# Patient Record
Sex: FEMALE | Race: WHITE | URBAN - METROPOLITAN AREA
[De-identification: names, ages, dates, MRNs, and addresses within clinical notes are randomized per-mention and may not be internally consistent; named-entity substitution may affect disease eponyms.]

---

## 2019-03-19 ENCOUNTER — OFFICE VISIT (OUTPATIENT)
Dept: URBAN - METROPOLITAN AREA CLINIC 32 | Facility: CLINIC | Age: 79
End: 2019-03-19
Payer: COMMERCIAL

## 2019-03-19 PROCEDURE — 99204 OFFICE O/P NEW MOD 45 MIN: CPT | Performed by: OPHTHALMOLOGY

## 2019-03-19 RX ORDER — OFLOXACIN 3 MG/ML
0.3 % SOLUTION/ DROPS OPHTHALMIC
Qty: 5 | Refills: 1 | Status: INACTIVE
Start: 2019-03-19 | End: 2019-07-02

## 2019-03-19 RX ORDER — DUREZOL 0.5 MG/ML
0.05 % EMULSION OPHTHALMIC
Qty: 2.5 | Refills: 1 | Status: INACTIVE
Start: 2019-03-19 | End: 2019-07-02

## 2019-03-19 ASSESSMENT — VISUAL ACUITY
OS: 20/60
OD: 20/60

## 2019-03-19 ASSESSMENT — INTRAOCULAR PRESSURE
OS: 19
OD: 18

## 2019-03-19 ASSESSMENT — KERATOMETRY
OD: 45.38
OS: 42.50

## 2019-07-02 ENCOUNTER — TESTING ONLY (OUTPATIENT)
Dept: URBAN - METROPOLITAN AREA CLINIC 32 | Facility: CLINIC | Age: 79
End: 2019-07-02
Payer: MEDICARE

## 2019-07-02 DIAGNOSIS — H25.813 COMBINED FORMS OF AGE-RELATED CATARACT, BILATERAL: Primary | ICD-10-CM

## 2019-07-02 PROCEDURE — 92136 OPHTHALMIC BIOMETRY: CPT | Performed by: OPHTHALMOLOGY

## 2019-07-02 RX ORDER — DUREZOL 0.5 MG/ML
0.05 % EMULSION OPHTHALMIC
Qty: 2.5 | Refills: 1 | Status: INACTIVE
Start: 2019-07-02 | End: 2020-12-10

## 2019-07-02 RX ORDER — OFLOXACIN 3 MG/ML
0.3 % SOLUTION/ DROPS OPHTHALMIC
Qty: 5 | Refills: 1 | Status: INACTIVE
Start: 2019-07-02 | End: 2020-12-10

## 2019-07-02 ASSESSMENT — PACHYMETRY
OD: 23.91
OS: 23.86
OD: 3.58
OS: 3.46

## 2019-07-08 ENCOUNTER — SURGERY (OUTPATIENT)
Dept: URBAN - METROPOLITAN AREA SURGERY 15 | Facility: SURGERY | Age: 79
End: 2019-07-08
Payer: MEDICARE

## 2019-07-08 PROCEDURE — 66984 XCAPSL CTRC RMVL W/O ECP: CPT | Performed by: OPHTHALMOLOGY

## 2019-07-09 ENCOUNTER — POST-OPERATIVE VISIT (OUTPATIENT)
Dept: URBAN - METROPOLITAN AREA CLINIC 32 | Facility: CLINIC | Age: 79
End: 2019-07-09

## 2019-07-09 DIAGNOSIS — Z09 ENCNTR FOR F/U EXAM AFT TRTMT FOR COND OTH THAN MALIG NEOPLM: Primary | ICD-10-CM

## 2019-07-09 PROCEDURE — 99024 POSTOP FOLLOW-UP VISIT: CPT | Performed by: OPHTHALMOLOGY

## 2019-07-09 ASSESSMENT — INTRAOCULAR PRESSURE
OS: 20
OD: 20

## 2019-07-15 ENCOUNTER — POST-OPERATIVE VISIT (OUTPATIENT)
Dept: URBAN - METROPOLITAN AREA CLINIC 32 | Facility: CLINIC | Age: 79
End: 2019-07-15

## 2019-07-15 ASSESSMENT — INTRAOCULAR PRESSURE
OD: 11
OS: 15

## 2019-07-15 ASSESSMENT — VISUAL ACUITY
OS: 20/150
OD: 20/30

## 2019-07-16 ENCOUNTER — SURGERY (OUTPATIENT)
Dept: URBAN - METROPOLITAN AREA SURGERY 15 | Facility: SURGERY | Age: 79
End: 2019-07-16
Payer: MEDICARE

## 2019-07-16 PROCEDURE — 66984 XCAPSL CTRC RMVL W/O ECP: CPT | Performed by: OPHTHALMOLOGY

## 2019-07-17 ENCOUNTER — POST-OPERATIVE VISIT (OUTPATIENT)
Dept: URBAN - METROPOLITAN AREA CLINIC 32 | Facility: CLINIC | Age: 79
End: 2019-07-17

## 2019-07-17 ASSESSMENT — INTRAOCULAR PRESSURE
OD: 15
OS: 17

## 2019-07-23 ENCOUNTER — POST-OPERATIVE VISIT (OUTPATIENT)
Dept: URBAN - METROPOLITAN AREA CLINIC 32 | Facility: CLINIC | Age: 79
End: 2019-07-23

## 2019-07-23 ASSESSMENT — INTRAOCULAR PRESSURE
OD: 12
OS: 10

## 2019-08-16 ENCOUNTER — POST-OPERATIVE VISIT (OUTPATIENT)
Dept: URBAN - METROPOLITAN AREA CLINIC 32 | Facility: CLINIC | Age: 79
End: 2019-08-16

## 2019-08-16 ASSESSMENT — INTRAOCULAR PRESSURE
OS: 10
OD: 9

## 2019-11-22 ENCOUNTER — POST-OPERATIVE VISIT (OUTPATIENT)
Dept: URBAN - METROPOLITAN AREA CLINIC 32 | Facility: CLINIC | Age: 79
End: 2019-11-22
Payer: MEDICARE

## 2019-11-22 ASSESSMENT — INTRAOCULAR PRESSURE
OS: 10
OD: 10

## 2020-12-10 ENCOUNTER — OFFICE VISIT (OUTPATIENT)
Dept: URBAN - METROPOLITAN AREA CLINIC 32 | Facility: CLINIC | Age: 80
End: 2020-12-10
Payer: MEDICARE

## 2020-12-10 DIAGNOSIS — Z96.1 PRESENCE OF INTRAOCULAR LENS: ICD-10-CM

## 2020-12-10 DIAGNOSIS — H04.123 DRY EYE SYNDROME: Primary | ICD-10-CM

## 2020-12-10 PROCEDURE — 92014 COMPRE OPH EXAM EST PT 1/>: CPT | Performed by: OPTOMETRIST

## 2020-12-10 ASSESSMENT — INTRAOCULAR PRESSURE
OD: 13
OS: 13

## 2020-12-10 ASSESSMENT — KERATOMETRY: OS: 42.63

## 2020-12-10 NOTE — IMPRESSION/PLAN
Impression: Dry Eye Syndrome: B86.586. Plan: Dry eyes account for the patient's complaints. There is no evidence of permanent changes to the cornea. Explained condition does not have a cure and will need artificial tears for maintenance.

## 2022-02-16 ENCOUNTER — OFFICE VISIT (OUTPATIENT)
Dept: URBAN - METROPOLITAN AREA CLINIC 32 | Facility: CLINIC | Age: 82
End: 2022-02-16
Payer: MEDICARE

## 2022-02-16 PROCEDURE — 99214 OFFICE O/P EST MOD 30 MIN: CPT | Performed by: OPTOMETRIST

## 2022-02-16 ASSESSMENT — VISUAL ACUITY
OD: 20/20
OS: 20/25

## 2022-02-16 ASSESSMENT — INTRAOCULAR PRESSURE
OD: 18
OS: 18

## 2022-02-16 NOTE — IMPRESSION/PLAN
Impression: Presence of intraocular lens: Z96.1. Plan: Discussed diagnosis with patient. IOL position well. Monitor yearly. 100

## 2022-02-16 NOTE — IMPRESSION/PLAN
Impression: Dry Eye Syndrome: A42.125. Plan: Dry eyes account for the patient's complaints. There is no evidence of permanent changes to the cornea. Explained condition does not have a cure and will need artificial tears for maintenance.

## 2023-02-24 ENCOUNTER — OFFICE VISIT (OUTPATIENT)
Dept: URBAN - METROPOLITAN AREA CLINIC 32 | Facility: CLINIC | Age: 83
End: 2023-02-24
Payer: MEDICARE

## 2023-02-24 DIAGNOSIS — H04.123 DRY EYE SYNDROME: Primary | ICD-10-CM

## 2023-02-24 PROCEDURE — 99214 OFFICE O/P EST MOD 30 MIN: CPT | Performed by: OPTOMETRIST

## 2023-02-24 ASSESSMENT — INTRAOCULAR PRESSURE
OS: 18
OD: 18

## 2023-02-24 ASSESSMENT — VISUAL ACUITY
OS: 20/25
OD: 20/20

## 2023-02-24 NOTE — IMPRESSION/PLAN
Impression: Dry Eye Syndrome: M09.408. Plan: Dry eyes account for the patient's complaints. There is no evidence of permanent changes to the cornea. Explained condition does not have a cure and will need artificial tears for maintenance. Discussed [facility] dry eye vitamins.

## 2024-02-09 ENCOUNTER — OFFICE VISIT (OUTPATIENT)
Dept: URBAN - METROPOLITAN AREA CLINIC 32 | Facility: CLINIC | Age: 84
End: 2024-02-09
Payer: MEDICARE

## 2024-02-09 DIAGNOSIS — H26.493 OTHER SECONDARY CATARACT, BILATERAL: ICD-10-CM

## 2024-02-09 DIAGNOSIS — H04.123 DRY EYE SYNDROME: Primary | ICD-10-CM

## 2024-02-09 PROCEDURE — 99214 OFFICE O/P EST MOD 30 MIN: CPT | Performed by: OPTOMETRIST

## 2024-02-09 ASSESSMENT — INTRAOCULAR PRESSURE
OD: 14
OS: 14

## 2024-02-09 ASSESSMENT — KERATOMETRY
OS: 42.50
OD: 42.88

## 2025-02-07 ENCOUNTER — OFFICE VISIT (OUTPATIENT)
Dept: URBAN - METROPOLITAN AREA CLINIC 32 | Facility: CLINIC | Age: 85
End: 2025-02-07
Payer: MEDICARE

## 2025-02-07 DIAGNOSIS — H04.123 DRY EYE SYNDROME: Primary | ICD-10-CM

## 2025-02-07 DIAGNOSIS — H43.811 VITREOUS DETACHMENT OF RIGHT EYE: ICD-10-CM

## 2025-02-07 DIAGNOSIS — Z96.1 PRESENCE OF INTRAOCULAR LENS: ICD-10-CM

## 2025-02-07 PROCEDURE — 99214 OFFICE O/P EST MOD 30 MIN: CPT | Performed by: OPTOMETRIST

## 2025-02-07 ASSESSMENT — KERATOMETRY
OS: 42.38
OD: 42.88

## 2025-02-07 ASSESSMENT — INTRAOCULAR PRESSURE
OS: 16
OD: 16